# Patient Record
Sex: FEMALE | Race: WHITE | URBAN - METROPOLITAN AREA
[De-identification: names, ages, dates, MRNs, and addresses within clinical notes are randomized per-mention and may not be internally consistent; named-entity substitution may affect disease eponyms.]

---

## 2017-11-14 ENCOUNTER — OFFICE VISIT (OUTPATIENT)
Dept: CARDIOLOGY CLINIC | Age: 22
End: 2017-11-14

## 2017-11-14 VITALS
HEIGHT: 62 IN | OXYGEN SATURATION: 99 % | BODY MASS INDEX: 26.13 KG/M2 | DIASTOLIC BLOOD PRESSURE: 60 MMHG | HEART RATE: 74 BPM | WEIGHT: 142 LBS | SYSTOLIC BLOOD PRESSURE: 100 MMHG

## 2017-11-14 DIAGNOSIS — R42 DIZZINESS: ICD-10-CM

## 2017-11-14 DIAGNOSIS — R00.2 PALPITATIONS: Primary | ICD-10-CM

## 2017-11-14 NOTE — MR AVS SNAPSHOT
Visit Information Date & Time Provider Department Dept. Phone Encounter #  
 11/14/2017  3:40 PM Jackie Amaro MD [facility] [place] [phone] [id number] Upcoming Health Maintenance Date Due  
 HPV AGE 9Y-34Y (1 of 3 - Female 3 Dose Series) 7/13/2006 DTaP/Tdap/Td series (1 - Tdap) 7/13/2016 PAP AKA CERVICAL CYTOLOGY 7/13/2016 Influenza Age 5 to Adult 8/1/2017 Allergies as of 11/14/2017  Review Complete On: 11/14/2017 By: Juan Francisco Hernandez  
 No Known Allergies Current Immunizations  Never Reviewed No immunizations on file. Not reviewed this visit You Were Diagnosed With   
  
 Codes Comments Palpitations    -  Primary ICD-10-CM: R00.2 ICD-9-CM: 785.1 Dizziness     ICD-10-CM: M33 ICD-9-CM: 780.4 Vitals BP Pulse Height(growth percentile) Weight(growth percentile) SpO2 BMI  
 100/60 (BP 1 Location: Right arm, BP Patient Position: Sitting) 74 5' 2\" (1.575 m) 142 lb (64.4 kg) 99% 25.97 kg/m2 Smoking Status Never Smoker Vitals History BMI and BSA Data Body Mass Index Body Surface Area  
 25.97 kg/m 2 1.68 m 2 Preferred Pharmacy Pharmacy Name Phone [id number] [address] AT [address] [place] [phone] Your Updated Medication List  
  
Notice  As of 11/14/2017  4:40 PM  
 You have not been prescribed any medications. We Performed the Following AMB POC EKG ROUTINE W/ 12 LEADS, INTER & REP [39842 CPT(R)] To-Do List   
 11/14/2017 Cardiac Services:  LOOP MONITOR Introducing [facility]! Vivienne Hoover introduces [employer] patient portal. Now you can access parts of your medical record, email your doctor's office, and request medication refills online. 1. In your internet browser, go to [email or web address] 2. Click on the First Time User? Click Here link in the Sign In box.  You will see the New Member Sign Up page. 3. Enter your Zeolife Access Code exactly as it appears below. You will not need to use this code after youve completed the sign-up process. If you do not sign up before the expiration date, you must request a new code. · Zeolife Access Code: [email or web address] Expires: 2/12/2018  4:40 PM 
 
4. Enter the last four digits of your Social Security Number (xxxx) and Date of Birth (mm/dd/yyyy) as indicated and click Submit. You will be taken to the next sign-up page. 5. Create a Zeolife ID. This will be your Zeolife login ID and cannot be changed, so think of one that is secure and easy to remember. 6. Create a Zeolife password. You can change your password at any time. 7. Enter your Password Reset Question and Answer. This can be used at a later time if you forget your password. 8. Enter your e-mail address. You will receive e-mail notification when new information is available in [address]. 9. Click Sign Up. You can now view and download portions of your medical record. 10. Click the Download Summary menu link to download a portable copy of your medical information. If you have questions, please visit the Frequently Asked Questions section of the Zeolife website. Remember, Zeolife is NOT to be used for urgent needs. For medical emergencies, dial 911. Now available from your iPhone and Android! Please provide this summary of care documentation to your next provider. Your primary care clinician is listed as Miles Fuller. If you have any questions after today's visit, please call [phone].

## 2017-11-14 NOTE — PROGRESS NOTES
[place]: Margarita Santizo  [phone]    History of Present Illness:   Ms. Estephania Beckett is a 33 yo F with history of palpitations here now for cardiac evaluation. She has had recent palpitations over the last few months that occur two to three times a week lasting for minutes at a time, occurring with no particular triggers, can be associated with lightheadedness, dizziness and presyncope, but she has never passed out. Her chest may feel heavy at times. No exertional chest pain or shortness of breath. She does say when she was 8years old, she had similar symptoms and was on Propranolol for one month. She is compensated on exam with clear lungs and no lower extremity edema. Her EKG was normal sinus rhythm with heart rate of 64 and nonspecific ST-T wave abnormality. Soc hx. No tobacco.  Fam hx. No early CAD  Assessment and Plan:   1. Heart palpitations. Will obtain a loop monitor to evaluate for possible arrhythmia. 2. Dizziness. Likely secondary to the above. She  has no past medical history on file. All other systems negative except as above. PE  Vitals:    11/14/17 1605   Pulse: 74   SpO2: 99%   Weight: 142 lb (64.4 kg)   Height: 5' 2\" (1.575 m)    Body mass index is 25.97 kg/(m^2).    General appearance - alert, well appearing, and in no distress  Mental status - affect appropriate to mood  Eyes - sclera anicteric, moist mucous membranes  Neck - supple, no JVD  Chest - clear to auscultation, no wheezes, rales or rhonchi  Heart - normal rate, regular rhythm, normal S1, S2, no murmurs, rubs, clicks or gallops  Abdomen - soft, nontender, nondistended, no masses or organomegaly  Neurological - no focal deficit  Extremities - peripheral pulses normal, no pedal edema      Recent Labs:  No results found for: CHOL, CHOLX, CHLST, CHOLV, 813097, HDL, LDL, LDLC, DLDLP, TGLX, TRIGL, TRIGP, CHHD, CHHDX  No results found for: SCARLETT, CREAPOC, MCREA, ACREA, CREA, REFC3, REFC4  No results found for: BUN, Consuelo Menendez, MBUNV, BUNV  No results found for: K, KI, PLK, WBK  No results found for: HBA1C, HGBE8, VUJ5OCDM  No results found for: HGBPOC, HGB, HGBP, HGBEXT  No results found for: PLT, PLTEXT    Reviewed:  No past medical history on file. History   Smoking Status    Never Smoker   Smokeless Tobacco    Never Used     History   Alcohol Use No     No Known Allergies    No current outpatient prescriptions on file. No current facility-administered medications for this visit.         Matthew Sledge, MD [facility] [place]  [address] [facility] [address]  [address]

## 2017-11-15 ENCOUNTER — OFFICE VISIT (OUTPATIENT)
Dept: CARDIOLOGY CLINIC | Age: 22
End: 2017-11-15

## 2017-11-15 DIAGNOSIS — R00.2 PALPITATIONS: ICD-10-CM

## 2017-12-11 ENCOUNTER — TELEPHONE (OUTPATIENT)
Dept: CARDIOLOGY CLINIC | Age: 22
End: 2017-12-11

## 2017-12-11 NOTE — TELEPHONE ENCOUNTER
Pt states that she has been wearing her heart monitor for two weeks and was calling to see when she should come in to have it checked. Please give her a call back @ [phone]. Thanks!   Deepti

## 2017-12-11 NOTE — TELEPHONE ENCOUNTER
Called patient, verified identity. Explained that the loop monitor will need to be mailed back and should have come with instruction. Patient acknowledged understanding. Informed patient that Dr. Catarina Hoff will receive the results upon completion and I will call with the results.

## 2018-01-05 ENCOUNTER — TELEPHONE (OUTPATIENT)
Dept: CARDIOLOGY CLINIC | Age: 23
End: 2018-01-05